# Patient Record
Sex: FEMALE | Race: OTHER | HISPANIC OR LATINO | Employment: UNEMPLOYED | URBAN - METROPOLITAN AREA
[De-identification: names, ages, dates, MRNs, and addresses within clinical notes are randomized per-mention and may not be internally consistent; named-entity substitution may affect disease eponyms.]

---

## 2018-07-22 ENCOUNTER — HOSPITAL ENCOUNTER (EMERGENCY)
Facility: HOSPITAL | Age: 33
Discharge: HOME | End: 2018-07-22
Attending: EMERGENCY MEDICINE | Admitting: EMERGENCY MEDICINE
Payer: MEDICARE

## 2018-07-22 ENCOUNTER — APPOINTMENT (EMERGENCY)
Dept: ULTRASOUND IMAGING | Facility: HOSPITAL | Age: 33
End: 2018-07-22
Payer: MEDICARE

## 2018-07-22 VITALS
HEIGHT: 62 IN | RESPIRATION RATE: 16 BRPM | HEART RATE: 98 BPM | DIASTOLIC BLOOD PRESSURE: 80 MMHG | TEMPERATURE: 96.8 F | SYSTOLIC BLOOD PRESSURE: 117 MMHG | WEIGHT: 127 LBS | OXYGEN SATURATION: 100 % | BODY MASS INDEX: 23.37 KG/M2

## 2018-07-22 DIAGNOSIS — O20.0 THREATENED ABORTION: Primary | ICD-10-CM

## 2018-07-22 DIAGNOSIS — O23.40 UTI (URINARY TRACT INFECTION) DURING PREGNANCY: ICD-10-CM

## 2018-07-22 LAB
ALBUMIN SERPL BCP-MCNC: 3.9 G/DL (ref 3–5.2)
ALP SERPL-CCNC: 43 U/L (ref 43–122)
ALT SERPL W P-5'-P-CCNC: 24 U/L (ref 9–52)
ANION GAP SERPL CALCULATED.3IONS-SCNC: 8 MMOL/L (ref 5–14)
AST SERPL W P-5'-P-CCNC: 26 U/L (ref 14–36)
B-HCG SERPL-ACNC: 3429.6 MIU/ML
BACTERIA UR QL AUTO: ABNORMAL /HPF
BASOPHILS # BLD AUTO: 0.1 THOUSANDS/ΜL (ref 0–0.1)
BASOPHILS NFR BLD AUTO: 1 % (ref 0–1)
BILIRUB SERPL-MCNC: 0.2 MG/DL
BILIRUB UR QL STRIP: NEGATIVE
BUN SERPL-MCNC: 10 MG/DL (ref 5–25)
CALCIUM SERPL-MCNC: 8.9 MG/DL (ref 8.4–10.2)
CHLORIDE SERPL-SCNC: 106 MMOL/L (ref 97–108)
CLARITY UR: CLEAR
CO2 SERPL-SCNC: 25 MMOL/L (ref 22–30)
COLOR UR: ABNORMAL
CREAT SERPL-MCNC: 0.75 MG/DL (ref 0.6–1.2)
EOSINOPHIL # BLD AUTO: 0.1 THOUSAND/ΜL (ref 0–0.4)
EOSINOPHIL NFR BLD AUTO: 2 % (ref 0–6)
ERYTHROCYTE [DISTWIDTH] IN BLOOD BY AUTOMATED COUNT: 13.8 %
EXT PREG TEST URINE: NORMAL
GFR SERPL CREATININE-BSD FRML MDRD: 107 ML/MIN/1.73SQ M
GLUCOSE SERPL-MCNC: 89 MG/DL (ref 70–99)
GLUCOSE UR STRIP-MCNC: NEGATIVE MG/DL
HCT VFR BLD AUTO: 39.3 % (ref 36–46)
HGB BLD-MCNC: 13.1 G/DL (ref 12–16)
HGB UR QL STRIP.AUTO: NEGATIVE
KETONES UR STRIP-MCNC: NEGATIVE MG/DL
LEUKOCYTE ESTERASE UR QL STRIP: 100
LIPASE SERPL-CCNC: 28 U/L (ref 23–300)
LYMPHOCYTES # BLD AUTO: 2.4 THOUSANDS/ΜL (ref 0.5–4)
LYMPHOCYTES NFR BLD AUTO: 31 % (ref 20–50)
MCH RBC QN AUTO: 32 PG (ref 26–34)
MCHC RBC AUTO-ENTMCNC: 33.4 G/DL (ref 31–36)
MCV RBC AUTO: 96 FL (ref 80–100)
MONOCYTES # BLD AUTO: 0.6 THOUSAND/ΜL (ref 0.2–0.9)
MONOCYTES NFR BLD AUTO: 8 % (ref 1–10)
MUCOUS THREADS UR QL AUTO: ABNORMAL
NEUTROPHILS # BLD AUTO: 4.6 THOUSANDS/ΜL (ref 1.8–7.8)
NEUTS SEG NFR BLD AUTO: 59 % (ref 45–65)
NITRITE UR QL STRIP: NEGATIVE
NON-SQ EPI CELLS URNS QL MICRO: ABNORMAL /HPF
OTHER STN SPEC: ABNORMAL
PH UR STRIP.AUTO: 6 [PH] (ref 4.5–8)
PLATELET # BLD AUTO: 326 THOUSANDS/UL (ref 150–450)
PMV BLD AUTO: 8.1 FL (ref 8.9–12.7)
POTASSIUM SERPL-SCNC: 3.9 MMOL/L (ref 3.6–5)
PROT SERPL-MCNC: 7.1 G/DL (ref 5.9–8.4)
PROT UR STRIP-MCNC: NEGATIVE MG/DL
RBC # BLD AUTO: 4.11 MILLION/UL (ref 4–5.2)
RBC #/AREA URNS AUTO: ABNORMAL /HPF
SODIUM SERPL-SCNC: 139 MMOL/L (ref 137–147)
SP GR UR STRIP.AUTO: 1.02 (ref 1–1.04)
UROBILINOGEN UA: NEGATIVE MG/DL
WBC # BLD AUTO: 7.8 THOUSAND/UL (ref 4.5–11)
WBC #/AREA URNS AUTO: ABNORMAL /HPF

## 2018-07-22 PROCEDURE — 80053 COMPREHEN METABOLIC PANEL: CPT | Performed by: PHYSICIAN ASSISTANT

## 2018-07-22 PROCEDURE — 87086 URINE CULTURE/COLONY COUNT: CPT | Performed by: PHYSICIAN ASSISTANT

## 2018-07-22 PROCEDURE — 81025 URINE PREGNANCY TEST: CPT | Performed by: PHYSICIAN ASSISTANT

## 2018-07-22 PROCEDURE — 87147 CULTURE TYPE IMMUNOLOGIC: CPT | Performed by: PHYSICIAN ASSISTANT

## 2018-07-22 PROCEDURE — 76801 OB US < 14 WKS SINGLE FETUS: CPT

## 2018-07-22 PROCEDURE — 83690 ASSAY OF LIPASE: CPT | Performed by: PHYSICIAN ASSISTANT

## 2018-07-22 PROCEDURE — 36415 COLL VENOUS BLD VENIPUNCTURE: CPT | Performed by: PHYSICIAN ASSISTANT

## 2018-07-22 PROCEDURE — 87660 TRICHOMONAS VAGIN DIR PROBE: CPT | Performed by: PHYSICIAN ASSISTANT

## 2018-07-22 PROCEDURE — 99284 EMERGENCY DEPT VISIT MOD MDM: CPT

## 2018-07-22 PROCEDURE — 87591 N.GONORRHOEAE DNA AMP PROB: CPT | Performed by: PHYSICIAN ASSISTANT

## 2018-07-22 PROCEDURE — 87491 CHLMYD TRACH DNA AMP PROBE: CPT | Performed by: PHYSICIAN ASSISTANT

## 2018-07-22 PROCEDURE — 87480 CANDIDA DNA DIR PROBE: CPT | Performed by: PHYSICIAN ASSISTANT

## 2018-07-22 PROCEDURE — 81001 URINALYSIS AUTO W/SCOPE: CPT | Performed by: PHYSICIAN ASSISTANT

## 2018-07-22 PROCEDURE — 81003 URINALYSIS AUTO W/O SCOPE: CPT | Performed by: PHYSICIAN ASSISTANT

## 2018-07-22 PROCEDURE — 85025 COMPLETE CBC W/AUTO DIFF WBC: CPT | Performed by: PHYSICIAN ASSISTANT

## 2018-07-22 PROCEDURE — 87510 GARDNER VAG DNA DIR PROBE: CPT | Performed by: PHYSICIAN ASSISTANT

## 2018-07-22 PROCEDURE — 84702 CHORIONIC GONADOTROPIN TEST: CPT | Performed by: PHYSICIAN ASSISTANT

## 2018-07-22 RX ORDER — ACETAMINOPHEN 325 MG/1
TABLET ORAL
Status: COMPLETED
Start: 2018-07-22 | End: 2018-07-22

## 2018-07-22 RX ORDER — NITROFURANTOIN 25; 75 MG/1; MG/1
100 CAPSULE ORAL 2 TIMES DAILY
Qty: 14 CAPSULE | Refills: 0 | Status: SHIPPED | OUTPATIENT
Start: 2018-07-22

## 2018-07-22 RX ORDER — SODIUM CHLORIDE 9 MG/ML
250 INJECTION, SOLUTION INTRAVENOUS CONTINUOUS
Status: DISCONTINUED | OUTPATIENT
Start: 2018-07-22 | End: 2018-07-22 | Stop reason: HOSPADM

## 2018-07-22 RX ORDER — ACETAMINOPHEN 325 MG/1
650 TABLET ORAL ONCE
Status: COMPLETED | OUTPATIENT
Start: 2018-07-22 | End: 2018-07-22

## 2018-07-22 RX ADMIN — ACETAMINOPHEN 650 MG: 325 TABLET ORAL at 09:14

## 2018-07-22 NOTE — ED PROVIDER NOTES
History  Chief Complaint   Patient presents with    Abdominal Pain     l started last night with a strong lower abdominal pain that feels like squeezing  No vaginal bleeding or discharge  but my period is late  History provided by:  Patient   used: No    Medical Problem   Location:  Pt with suprapubic and left pelvis pain   Severity:  Mild  Onset quality:  Gradual  Duration:  1 day  Timing:  Constant  Progression:  Unchanged  Chronicity:  New  Associated symptoms: abdominal pain    Associated symptoms: no chest pain, no congestion, no cough, no diarrhea, no ear pain, no fatigue, no fever, no headaches, no loss of consciousness, no myalgias, no nausea, no rash, no rhinorrhea, no shortness of breath, no sore throat, no vomiting and no wheezing        None       No past medical history on file  No past surgical history on file  No family history on file  I have reviewed and agree with the history as documented  Social History   Substance Use Topics    Smoking status: Current Every Day Smoker     Packs/day: 2 00    Smokeless tobacco: Current User    Alcohol use No        Review of Systems   Constitutional: Negative for fatigue and fever  HENT: Negative  Negative for congestion, ear pain, rhinorrhea and sore throat  Eyes: Negative  Respiratory: Negative  Negative for cough, shortness of breath and wheezing  Cardiovascular: Negative  Negative for chest pain  Gastrointestinal: Positive for abdominal pain  Negative for diarrhea, nausea and vomiting  Endocrine: Negative  Genitourinary: Negative  Musculoskeletal: Negative  Negative for myalgias  Skin: Negative for rash  Allergic/Immunologic: Negative  Neurological: Negative  Negative for loss of consciousness and headaches  Hematological: Negative  Psychiatric/Behavioral: Negative  All other systems reviewed and are negative        Physical Exam  Physical Exam   Constitutional: She is oriented to person, place, and time  She appears well-developed and well-nourished  a0   HENT:   Head: Normocephalic and atraumatic  Right Ear: External ear normal    Left Ear: External ear normal    Nose: Nose normal    Mouth/Throat: Oropharynx is clear and moist    Eyes: Conjunctivae and EOM are normal  Pupils are equal, round, and reactive to light  Neck: Normal range of motion  Neck supple  Cardiovascular: Normal rate, regular rhythm and normal heart sounds  Pulmonary/Chest: Effort normal    Abdominal: Soft  Bowel sounds are normal    Suprapubic and left pelvis pain    Genitourinary:   Genitourinary Comments: External exam wnl  Os closed no blood no d/c  Uterine and left ovary tender   Musculoskeletal: Normal range of motion  Neurological: She is alert and oriented to person, place, and time  Skin: Skin is warm  Psychiatric: She has a normal mood and affect  Her behavior is normal  Judgment and thought content normal    Nursing note and vitals reviewed        Vital Signs  ED Triage Vitals [18 0844]   Temperature Pulse Respirations Blood Pressure SpO2   (!) 96 8 °F (36 °C) 98 16 117/80 100 %      Temp Source Heart Rate Source Patient Position - Orthostatic VS BP Location FiO2 (%)   Temporal Monitor Sitting Left arm --      Pain Score       8           Vitals:    18 0844   BP: 117/80   Pulse: 98   Patient Position - Orthostatic VS: Sitting       Visual Acuity      ED Medications  Medications   acetaminophen (TYLENOL) tablet 650 mg (650 mg Oral Given 18 0914)       Diagnostic Studies  Results Reviewed     Procedure Component Value Units Date/Time    hCG, quantitative [[id number]]  (Abnormal) Collected:  18    Lab Status:  Final result Specimen:  Blood from Arm, Right Updated:  18 1043     HCG, Quant 3,429 6 (H) mIU/mL     Narrative:         Pregnant Gestational Age           HCG Range (mIU/mL)  4 weeks                              44 - 6952  5 weeks 630 - 43078        6 - 7 weeks                          544 - 510193  8 - 10 weeks                         48258 - 086583  03 - 12 weeks                        41690 - 690239  92 - 27 weeks (2nd Trimester)        9058 - 510204  28 - 40 weeks (3rd Trimester)        173 796 002 - [id number]                 VAGINOSIS DNA PROBE (AFFIRM) [[id number]] Collected:  07/22/18 0955    Lab Status: In process Specimen:  Genital from Vaginal Updated:  07/22/18 0959    Chlamydia/GC amplified DNA by PCR [[id number]] Collected:  07/22/18 0955    Lab Status: In process Specimen:  Genital from Cervix Updated:  07/22/18 0959    Urine Microscopic [[id number]]  (Abnormal) Collected:  07/22/18 0917    Lab Status:  Final result Specimen:  Urine from Urine, Clean Catch Updated:  07/22/18 0953     RBC, UA 0-1 (A) /hpf      WBC, UA 30-50 (A) /hpf      Epithelial Cells Moderate (A) /hpf      Bacteria, UA Moderate (A) /hpf      OTHER OBSERVATIONS Trichomonas Organisms Present     MUCOUS THREADS Moderate (A)    Urine culture [[id number]] Collected:  07/22/18 0917    Lab Status:   In process Specimen:  Urine from Urine, Clean Catch Updated:  07/22/18 0953    Lipase [[id number]]  (Normal) Collected:  07/22/18 0922    Lab Status:  Final result Specimen:  Blood from Arm, Right Updated:  07/22/18 0946     Lipase 28 u/L     Comprehensive metabolic panel [[id number]]  (Normal) Collected:  07/22/18 4674    Lab Status:  Final result Specimen:  Blood from Arm, Right Updated:  07/22/18 0946     Sodium 139 mmol/L      Potassium 3 9 mmol/L      Chloride 106 mmol/L      CO2 25 mmol/L      Anion Gap 8 mmol/L      BUN 10 mg/dL      Creatinine 0 75 mg/dL      Glucose 89 mg/dL      Calcium 8 9 mg/dL      AST 26 U/L      ALT 24 U/L      Alkaline Phosphatase 43 U/L      Total Protein 7 1 g/dL      Albumin 3 9 g/dL      Total Bilirubin 0 20 mg/dL      eGFR 107 ml/min/1 73sq m     Narrative:         National Kidney Disease Education Program recommendations are as follows:  GFR calculation is accurate only with a steady state creatinine  Chronic Kidney disease less than 60 ml/min/1 73 sq  meters  Kidney failure less than 15 ml/min/1 73 sq  meters  CBC and differential [[id number]]  (Abnormal) Collected:  07/22/18 0922    Lab Status:  Final result Specimen:  Blood from Arm, Right Updated:  07/22/18 0935     WBC 7 80 Thousand/uL      RBC 4 11 Million/uL      Hemoglobin 13 1 g/dL      Hematocrit 39 3 %      MCV 96 fL      MCH 32 0 pg      MCHC 33 4 g/dL      RDW 13 8 %      MPV 8 1 (L) fL      Platelets 660 Thousands/uL      Neutrophils Relative 59 %      Lymphocytes Relative 31 %      Monocytes Relative 8 %      Eosinophils Relative 2 %      Basophils Relative 1 %      Neutrophils Absolute 4 60 Thousands/µL      Lymphocytes Absolute 2 40 Thousands/µL      Monocytes Absolute 0 60 Thousand/µL      Eosinophils Absolute 0 10 Thousand/µL      Basophils Absolute 0 10 Thousands/µL     UA w Reflex to Microscopic w Reflex to Culture [[id number]]  (Abnormal) Collected:  07/22/18 0917    Lab Status:  Final result Specimen:  Urine from Urine, Clean Catch Updated:  07/22/18 0932     Color, UA Winnie (A)     Clarity, UA Clear     Specific [place], UA 1 020     pH, UA 6 0     Leukocytes,  0 (A)     Nitrite, UA Negative     Protein, UA Negative mg/dl      Glucose, UA Negative mg/dl      Ketones, UA Negative mg/dl      Bilirubin, UA Negative     Blood, UA Negative     UROBILINOGEN UA Negative mg/dL     POCT pregnancy, urine [[id number]]  (Normal) Resulted:  07/22/18 0913    Lab Status:  Final result Updated:  07/22/18 0913     EXT PREG TEST UR (Ref: Negative) positive preg                 US OB < 14 weeks with transvaginal   Final Result by Holly Thorpe MD (07/22 1143)   Potential early gestational sac seen within continued ultrasound follow-up is recommended  The endometrium  There is no fetal pole present  Correlate with serial quantitative BHCG is also recommended              Workstation performed: [id number]                    Procedures  Procedures       Phone Contacts  ED Phone Contact    ED Course                               MDM  CritCare Time    Disposition  Final diagnoses:   Threatened    UTI (urinary tract infection) during pregnancy     Time reflects when diagnosis was documented in both MDM as applicable and the Disposition within this note     Time User Action Codes Description Comment    2018 12:31 PM Karely Pearl Add [O20 0] Threatened      2018 12:31 PM [address] [place] [O23 40] UTI (urinary tract infection) during pregnancy       ED Disposition     ED Disposition Condition Comment    Discharge  [facility] discharge to home/self care  Condition at discharge: Good        Follow-up Information     Follow up With Specialties Details Why [address] [facility] Obstetrics and Gynecology Schedule an appointment as soon as possible for a visit repeat blood work in 3 days   return if condtion worsens  [address]  [address] [place] [id number] [phone]            Discharge Medication List as of 2018 12:35 PM      START taking these medications    Details   nitrofurantoin (MACROBID) 100 mg capsule Take 1 capsule (100 mg total) by mouth 2 (two) times a day, Starting Sun 2018, Print           No discharge procedures on file      ED Provider  Electronically Signed by           Danna Celaya PA-C  18 2346

## 2018-07-22 NOTE — DISCHARGE INSTRUCTIONS
Amenaza de aborto natural   LO QUE NECESITA SABER:   Clevester Argyle de aborto ocurre cuando se tiene sangrado vaginal dentro de las primeras 20 semanas de embarazo  Eso indica que podría ocurrir un aborto natural  Clevester Argyle de un aborto natural también se le conoce molly sandhya amenaza de pérdida del [facility]  INSTRUCCIONES SOBRE EL [facility]ITAL[facility]:   Regrese a la devin de emergencias si:   · Se siente débil o que se desmaya  · Tiene dolor o cólicos en el abdomen o en la espalda que empeoran  · Tiene sangrado vaginal que en sandhya hora empapa por lo menos 1 toalla sanitaria  · Le sale un material que parece tejido o coágulos grandes  Comuníquese con griggs médico o obstreta si:   · Usted tiene fiebre  · Tiene problemas para orinar, siente ardor o la necesidad de orinar con frecuencia  · Tiene sangrado vaginal nuevo o que empeora  · Tiene dolor o comezón vaginal o flujo vaginal de color amarillo o marcos o maloliente  · Usted tiene preguntas o inquietudes acerca de griggs condición o cuidado  Cuidados personales:  Los siguientes podrían ayudar a controlar los síntomas y disminuir griggs riesgo de un aborto natural:  · No se ponga nada dentro de griggs vagina  No tenga relaciones sexuales, no tome duchas vaginales ni use tampones  Estas acciones pueden aumentar griggs riesgo de sandhya infección o de un aborto natural      · Repose según le indicaron  No practique ningún ejercicio ni actividades vigorosas  Estas actividades pueden causar un parto prematuro o un aborto natural  Pregunte a griggs médico cuáles actividades físicas son las apropiadas para usted  Manténgase saludable sonny el embarazo:   · Consuma alimentos saludables y variados  Los alimentos sanos pueden ayudarla a obtener las proteínas y calorías adicionales que usted necesita sonny el [facility]  Los alimentos saludables incluyen frutas, verduras, pan integral, productos lácteos bajos en grasa, frijoles, madai magras y pescado   Evite la carne y pescado crudos o que no estén cocinados completamente  Consulte con sutton médico en scar que sea necesario que siga sandhya dieta especial      · [place] vitaminas prenatales según las indicaciones  Estas ayudan a obtener la cantidad correcta de vitaminas y minerales  También podrían ayudar a disminuir el riesgo de ciertos defectos de nacimiento  · No consuma alcohol ni drogas ilegales  Estos pueden aumentar el riesgo de un aborto espontáneo o perjudicar al bebé  · No fume  La nicotina y otros químicos en los cigarrillos y cigarros pueden perjudicar a sutton bebé y provocar un aborto natural o un parto prematuro  Pida información a sutton médico si usted actualmente fuma y necesita ayuda para dejar de fumar  Los cigarrillos electrónicos o tabaco sin humo todavía contienen nicotina  No use estos productos  · Disminuya el riesgo de sandhya infección  Siempre lave paul esha antes de comer o preparar alimentos  No pase tiempo con gente que está enferma  Pregúntele a sutton médico si necesita vacunas molly la vacuna contra la gripe o la hepatitis B  Las vacunas pueden disminuir sutton riesgo de contraer infecciones que pueden causar un aborto espontáneo  · Controle paul afecciones médicas  [place] Door control sutton presión arterial y azúcar en la roland  Mantenga un peso saludable sonny Lemon [place]  Programe sandhya sona con sutton obstétrico molly se le indique:  Es posible que usted deba acudir a consulta con sutton ginecólogo frecuentemente para que le ordene ecografías o más exámenes de [place]  Anote paul preguntas para que se acuerde de hacerlas sonny paul visitas  © 2017 [address] [employer] Information is for End User's use only and may not be sold, redistributed or otherwise used for commercial purposes  All illustrations and images included in CareNotes® are the copyrighted property of A D A M , Inc  or Henry Hernandez  Esta información es sólo para uso en educación   Sutton intención no es darle un consejo Leroy & Alexandra enfermedades o tratamientos  Colsulte con griggs Benuel Moran farmacéutico antes de seguir cualquier régimen médico para saber si es seguro y efectivo para usted  Amenaza de aborto natural   CUIDADO AMBULATORIO:   Thom Valverde de aborto  involuntario ocurre cuando se tiene sangrado vaginal sonny las primeras 20 semanas del embarazo  Eso indica que podría ocurrir un aborto natural  Thom Valverde de un aborto natural también se le conoce molly sandhya amenaza de pérdida del [facility]  Los signos y síntomas de Thom Valverde de aborto:   · Manchado o sangrado vaginal    · Dolor o cólicos en el abdomen o área lumbar  Busque atención médica de inmediato si:   · Se siente débil o que se desmaya  · Tiene dolor o cólicos en el abdomen o en la espalda que empeoran  · Tiene sangrado vaginal que en sandhya hora empapa por lo menos 1 toalla sanitaria  · Le sale un material que parece tejido o coágulos grandes  Comuníquese con griggs médico o obstreta si:   · Usted tiene fiebre  · Tiene problemas para orinar, siente ardor o la necesidad de orinar con frecuencia  · Tiene sangrado vaginal nuevo o que empeora  · Tiene dolor o comezón vaginal o flujo vaginal de color amarillo o marcos o maloliente  · Usted tiene preguntas o inquietudes acerca de griggs condición o cuidado  Cuidados personales:  Los siguientes podrían ayudar a controlar los síntomas y disminuir griggs riesgo de un aborto natural:  · No se ponga nada dentro de griggs vagina  No tenga relaciones sexuales, no tome duchas vaginales ni use tampones  Estas acciones pueden aumentar griggs riesgo de sandhya infección o de un aborto natural      · Repose según le indicaron  No practique ningún ejercicio ni actividades vigorosas  Estas actividades pueden causar un parto prematuro o un aborto natural  Pregunte a griggs médico cuáles actividades físicas son las apropiadas para usted  Manténgase saludable sonny el embarazo:   · Consuma alimentos saludables y variados    Los alimentos sanos pueden ayudarla a obtener las proteínas y calorías adicionales que usted necesita sonny el Breezy Clines  Los alimentos saludables incluyen frutas, verduras, pan integral, productos lácteos bajos en grasa, frijoles, madai magras y pescado  Evite la carne y pescado crudos o que no estén cocinados completamente  Consulte con griggs médico en scar que sea necesario que siga sandhya dieta especial      · [place] vitaminas prenatales según las indicaciones  Estas ayudan a obtener la cantidad correcta de vitaminas y minerales  También podrían ayudar a disminuir el riesgo de ciertos defectos de nacimiento  · No consuma alcohol ni drogas ilegales  Estos pueden aumentar el riesgo de un aborto espontáneo o perjudicar al bebé  · No fume  La nicotina y otros químicos en los cigarrillos y cigarros pueden perjudicar a griggs bebé y provocar un aborto natural o un parto prematuro  Pida información a griggs médico si usted actualmente fuma y necesita ayuda para dejar de fumar  Los cigarrillos electrónicos o tabaco sin humo todavía contienen nicotina  No use estos productos  · Disminuya el riesgo de sandhya infección  Siempre lave paul esha antes de comer o preparar alimentos  No pase tiempo con gente que está enferma  Pregúntele a griggs médico si necesita vacunas molly la vacuna contra la gripe o la hepatitis B  Las vacunas pueden disminuir griggs riesgo de contraer infecciones que pueden causar un aborto espontáneo  · Controle paul afecciones médicas  Laverta Bickers control griggs presión arterial y azúcar en la roland  Mantenga un peso saludable sonny Katha Arm  Programe sandhya sona con griggs obstétrico molly se le indique:  Es posible que usted deba acudir a consulta con griggs ginecólogo frecuentemente para que le ordene ecografías o más exámenes de [place]  Anote paul preguntas para que se acuerde de hacerlas sonny paul visitas    © 2017 [address] [employer] Information is for End User's use only and may not be sold, redistributed or otherwise used for commercial purposes  All illustrations and images included in CareNotes® are the copyrighted property of A D A M , Inc  or Henry Hernandez  Esta información es sólo para uso en educación  Griggs intención no es darle un consejo médico sobre enfermedades o tratamientos  Colsulte con griggs Shaka Berry farmacéutico antes de seguir cualquier régimen médico para saber si es seguro y efectivo para usted

## 2018-07-24 LAB
BACTERIA UR CULT: ABNORMAL
CANDIDA RRNA VAG QL PROBE: NEGATIVE
G VAGINALIS RRNA GENITAL QL PROBE: NEGATIVE
T VAGINALIS RRNA GENITAL QL PROBE: POSITIVE

## 2018-07-25 LAB
CHLAMYDIA DNA CVX QL NAA+PROBE: NORMAL
N GONORRHOEA DNA GENITAL QL NAA+PROBE: NORMAL

## 2019-04-08 ENCOUNTER — HOSPITAL ENCOUNTER (EMERGENCY)
Facility: HOSPITAL | Age: 34
Discharge: HOME | End: 2019-04-08
Attending: EMERGENCY MEDICINE | Admitting: EMERGENCY MEDICINE
Payer: COMMERCIAL

## 2019-04-08 VITALS
TEMPERATURE: 97.5 F | BODY MASS INDEX: 25.61 KG/M2 | HEART RATE: 82 BPM | SYSTOLIC BLOOD PRESSURE: 112 MMHG | DIASTOLIC BLOOD PRESSURE: 70 MMHG | OXYGEN SATURATION: 100 % | RESPIRATION RATE: 19 BRPM | WEIGHT: 140 LBS

## 2019-04-08 DIAGNOSIS — R51.9 HEADACHE: Primary | ICD-10-CM

## 2019-04-08 DIAGNOSIS — R55 VASOVAGAL SYNCOPE: ICD-10-CM

## 2019-04-08 DIAGNOSIS — F41.9 ANXIETY: ICD-10-CM

## 2019-04-08 LAB
ANION GAP SERPL CALCULATED.3IONS-SCNC: 9 MMOL/L (ref 5–14)
BASOPHILS # BLD AUTO: 0.1 THOUSANDS/ΜL (ref 0–0.1)
BASOPHILS NFR BLD AUTO: 1 % (ref 0–1)
BUN SERPL-MCNC: 12 MG/DL (ref 5–25)
CALCIUM SERPL-MCNC: 9.3 MG/DL (ref 8.4–10.2)
CHLORIDE SERPL-SCNC: 107 MMOL/L (ref 97–108)
CO2 SERPL-SCNC: 22 MMOL/L (ref 22–30)
CREAT SERPL-MCNC: 0.81 MG/DL (ref 0.6–1.2)
EOSINOPHIL # BLD AUTO: 0.2 THOUSAND/ΜL (ref 0–0.4)
EOSINOPHIL NFR BLD AUTO: 2 % (ref 0–6)
ERYTHROCYTE [DISTWIDTH] IN BLOOD BY AUTOMATED COUNT: 15 %
GFR SERPL CREATININE-BSD FRML MDRD: 111 ML/MIN/1.73SQ M
GLUCOSE SERPL-MCNC: 94 MG/DL (ref 70–99)
HCT VFR BLD AUTO: 39.6 % (ref 36–46)
HGB BLD-MCNC: 13.2 G/DL (ref 12–16)
LYMPHOCYTES # BLD AUTO: 3 THOUSANDS/ΜL (ref 0.5–4)
LYMPHOCYTES NFR BLD AUTO: 33 % (ref 25–45)
MCH RBC QN AUTO: 32 PG (ref 26–34)
MCHC RBC AUTO-ENTMCNC: 33.5 G/DL (ref 31–36)
MCV RBC AUTO: 96 FL (ref 80–100)
MONOCYTES # BLD AUTO: 0.5 THOUSAND/ΜL (ref 0.2–0.9)
MONOCYTES NFR BLD AUTO: 6 % (ref 1–10)
NEUTROPHILS # BLD AUTO: 5.5 THOUSANDS/ΜL (ref 1.8–7.8)
NEUTS SEG NFR BLD AUTO: 59 % (ref 45–65)
PLATELET # BLD AUTO: 388 THOUSANDS/UL (ref 150–450)
PMV BLD AUTO: 7.8 FL (ref 8.9–12.7)
POTASSIUM SERPL-SCNC: 4.1 MMOL/L (ref 3.6–5)
RBC # BLD AUTO: 4.14 MILLION/UL (ref 4–5.2)
SODIUM SERPL-SCNC: 138 MMOL/L (ref 137–147)
TROPONIN I SERPL-MCNC: <0.01 NG/ML (ref 0–0.03)
WBC # BLD AUTO: 9.3 THOUSAND/UL (ref 4.5–11)

## 2019-04-08 PROCEDURE — 99285 EMERGENCY DEPT VISIT HI MDM: CPT

## 2019-04-08 PROCEDURE — 96374 THER/PROPH/DIAG INJ IV PUSH: CPT

## 2019-04-08 PROCEDURE — 96361 HYDRATE IV INFUSION ADD-ON: CPT

## 2019-04-08 PROCEDURE — 84484 ASSAY OF TROPONIN QUANT: CPT | Performed by: EMERGENCY MEDICINE

## 2019-04-08 PROCEDURE — 99284 EMERGENCY DEPT VISIT MOD MDM: CPT | Performed by: EMERGENCY MEDICINE

## 2019-04-08 PROCEDURE — 96375 TX/PRO/DX INJ NEW DRUG ADDON: CPT

## 2019-04-08 PROCEDURE — 85025 COMPLETE CBC W/AUTO DIFF WBC: CPT | Performed by: EMERGENCY MEDICINE

## 2019-04-08 PROCEDURE — 36415 COLL VENOUS BLD VENIPUNCTURE: CPT | Performed by: EMERGENCY MEDICINE

## 2019-04-08 PROCEDURE — 93005 ELECTROCARDIOGRAM TRACING: CPT

## 2019-04-08 PROCEDURE — 80048 BASIC METABOLIC PNL TOTAL CA: CPT | Performed by: EMERGENCY MEDICINE

## 2019-04-08 RX ORDER — METOCLOPRAMIDE HYDROCHLORIDE 5 MG/ML
10 INJECTION INTRAMUSCULAR; INTRAVENOUS ONCE
Status: COMPLETED | OUTPATIENT
Start: 2019-04-08 | End: 2019-04-08

## 2019-04-08 RX ORDER — DIPHENHYDRAMINE HYDROCHLORIDE 50 MG/ML
25 INJECTION INTRAMUSCULAR; INTRAVENOUS ONCE
Status: COMPLETED | OUTPATIENT
Start: 2019-04-08 | End: 2019-04-08

## 2019-04-08 RX ORDER — DEXAMETHASONE SODIUM PHOSPHATE 4 MG/ML
8 INJECTION, SOLUTION INTRA-ARTICULAR; INTRALESIONAL; INTRAMUSCULAR; INTRAVENOUS; SOFT TISSUE ONCE
Status: COMPLETED | OUTPATIENT
Start: 2019-04-08 | End: 2019-04-08

## 2019-04-08 RX ADMIN — SODIUM CHLORIDE 500 ML: 9 INJECTION, SOLUTION INTRAVENOUS at 11:30

## 2019-04-08 RX ADMIN — DEXAMETHASONE SODIUM PHOSPHATE 8 MG: 4 INJECTION, SOLUTION INTRAMUSCULAR; INTRAVENOUS at 11:26

## 2019-04-08 RX ADMIN — DIPHENHYDRAMINE HYDROCHLORIDE 25 MG: 50 INJECTION INTRAMUSCULAR; INTRAVENOUS at 11:22

## 2019-04-08 RX ADMIN — METOCLOPRAMIDE 10 MG: 5 INJECTION, SOLUTION INTRAMUSCULAR; INTRAVENOUS at 11:30

## 2019-04-09 LAB
ATRIAL RATE: 82 BPM
P AXIS: 59 DEGREES
PR INTERVAL: 166 MS
QRS AXIS: 54 DEGREES
QRSD INTERVAL: 80 MS
QT INTERVAL: 390 MS
QTC INTERVAL: 455 MS
T WAVE AXIS: 45 DEGREES
VENTRICULAR RATE: 82 BPM

## 2019-04-09 PROCEDURE — 93010 ELECTROCARDIOGRAM REPORT: CPT | Performed by: INTERNAL MEDICINE
